# Patient Record
Sex: FEMALE | Race: WHITE | NOT HISPANIC OR LATINO | Employment: UNEMPLOYED | URBAN - METROPOLITAN AREA
[De-identification: names, ages, dates, MRNs, and addresses within clinical notes are randomized per-mention and may not be internally consistent; named-entity substitution may affect disease eponyms.]

---

## 2020-10-14 ENCOUNTER — SOCIAL WORK (OUTPATIENT)
Dept: ADMINISTRATIVE | Facility: OTHER | Age: 35
End: 2020-10-14

## 2020-10-14 NOTE — PROGRESS NOTES
SW met with pt regarding initial OB assessment. Pt stated this is her 10th pregnancy/6-miscarriage/3-ectopic. Pt stated lives with her boyfriend and able to perform ADL's independently. Pt stated support system is her mother/Beatrice. Pt stated has medicaid([facility]). Pt stated does not have WIC. SW provide pt with information on other community resources.SW faxed and scanned pt's notification of pregnancy into epic.  No other needs identified at this time.    Abbey Casanova MSW  Pager#[phone]

## 2020-10-20 PROBLEM — O99.281 HYPOTHYROIDISM AFFECTING PREGNANCY IN FIRST TRIMESTER: Status: RESOLVED | Noted: 2020-10-20 | Resolved: 2020-10-20

## 2020-10-20 PROBLEM — O99.281 HYPOTHYROIDISM AFFECTING PREGNANCY IN FIRST TRIMESTER: Status: ACTIVE | Noted: 2020-10-20

## 2020-10-20 PROBLEM — O16.1 ELEVATED BLOOD PRESSURE AFFECTING PREGNANCY IN FIRST TRIMESTER, ANTEPARTUM: Status: ACTIVE | Noted: 2020-10-20

## 2020-10-20 PROBLEM — O00.102 LEFT TUBAL PREGNANCY WITHOUT INTRAUTERINE PREGNANCY: Status: ACTIVE | Noted: 2020-10-20

## 2020-10-20 PROBLEM — E03.9 HYPOTHYROIDISM AFFECTING PREGNANCY IN FIRST TRIMESTER: Status: RESOLVED | Noted: 2020-10-20 | Resolved: 2020-10-20

## 2020-10-20 PROBLEM — R03.0 ELEVATED BLOOD PRESSURE READING: Status: ACTIVE | Noted: 2020-10-20

## 2020-10-20 PROBLEM — E03.9 HYPOTHYROIDISM AFFECTING PREGNANCY IN FIRST TRIMESTER: Status: ACTIVE | Noted: 2020-10-20

## 2020-10-20 PROBLEM — R79.89 ELEVATED TSH: Status: ACTIVE | Noted: 2020-10-20

## 2020-10-20 PROBLEM — O02.1 MISSED ABORTION: Status: ACTIVE | Noted: 2020-10-20

## 2020-10-20 PROBLEM — N96 RECURRENT PREGNANCY LOSS: Status: ACTIVE | Noted: 2020-10-20

## 2020-10-20 PROBLEM — K42.9 UMBILICAL HERNIA WITHOUT OBSTRUCTION AND WITHOUT GANGRENE: Status: ACTIVE | Noted: 2017-01-25

## 2021-03-11 PROBLEM — K42.9 UMBILICAL HERNIA WITHOUT OBSTRUCTION AND WITHOUT GANGRENE: Status: RESOLVED | Noted: 2017-01-25 | Resolved: 2021-03-11

## 2021-03-11 PROBLEM — O02.1 MISSED ABORTION: Status: RESOLVED | Noted: 2020-10-20 | Resolved: 2021-03-11

## 2021-03-11 PROBLEM — R03.0 ELEVATED BLOOD PRESSURE READING: Status: RESOLVED | Noted: 2020-10-20 | Resolved: 2021-03-11

## 2021-03-15 PROBLEM — O16.1 ELEVATED BLOOD PRESSURE AFFECTING PREGNANCY IN FIRST TRIMESTER, ANTEPARTUM: Status: RESOLVED | Noted: 2020-10-20 | Resolved: 2021-03-15

## 2021-03-15 PROBLEM — O09.529 AMA (ADVANCED MATERNAL AGE) MULTIGRAVIDA 35+: Status: ACTIVE | Noted: 2021-03-15

## 2021-03-15 PROBLEM — O99.210 OBESITY IN PREGNANCY: Status: ACTIVE | Noted: 2021-03-15

## 2021-03-15 PROBLEM — O09.91 HIGH-RISK PREGNANCY IN FIRST TRIMESTER: Status: ACTIVE | Noted: 2021-03-15

## 2021-03-15 PROBLEM — O02.1 MISSED AB: Status: ACTIVE | Noted: 2021-03-15
